# Patient Record
Sex: FEMALE | Race: BLACK OR AFRICAN AMERICAN | NOT HISPANIC OR LATINO | Employment: OTHER | URBAN - METROPOLITAN AREA
[De-identification: names, ages, dates, MRNs, and addresses within clinical notes are randomized per-mention and may not be internally consistent; named-entity substitution may affect disease eponyms.]

---

## 2024-09-25 DIAGNOSIS — Z12.11 COLON CANCER SCREENING: Primary | ICD-10-CM

## 2024-10-08 RX ORDER — LISINOPRIL AND HYDROCHLOROTHIAZIDE 12.5; 2 MG/1; MG/1
1 TABLET ORAL DAILY
COMMUNITY

## 2024-10-08 RX ORDER — ERGOCALCIFEROL 1.25 MG/1
50000 CAPSULE ORAL
COMMUNITY

## 2024-10-08 NOTE — H&P
Name AGUILAR ESCOBEDO (73yo, F)       1950         Ins[place] Med [place]  Insurance # : [id number]  Med Secondary[place]  Insurance # : [id number]  Policy/Group # : [id number]  Prescription: [facility] - Member is eligible. details     Chief Complaint  Colonoscopy Screening  Patient's Care Team  Primary Care Provider: SERA MOYA [facility] [address] [facility] [place] [address], Ph [phone], Fax [phone] NPI: [id number]   Patient's Pharmacies  [id number] ([facility]): [address] [facility] [place] [address], Ph [phone], Fax [phone]   Vitals    T: 97.1 F° 2024 11:16 am BP: 139/100 2024 11:17 am Pulse: 73 bpm 2024 11:17 am   O2Sat: 98% 2024 11:16 am Ht: 5 ft 3 in 2024 11:15 am Wt: 182 lbs 2024 11:15 am   BMI: 32.2 2024 11:15 am       Allergies    Reviewed Allergies   NKDA   Medications    Reviewed Medications    bisacodyL 5 mg tablet,delayed release  Take 4 tablet(s) as needed by oral route as directed.  Internal Note: take all four tablets for 12:00 noon the day before procedure 24   prescribed Audrey Ba NP   ergocalciferol (vitamin D2) 1,250 mcg (50,000 unit) capsule  TAKE 1 CAPSULE BY MOUTH WEEKLY FOR 90 DAYS 24   filled surescripts   lisinopriL 20 mg-hydrochlorothiazide 12.5 mg tablet 10/23/14   filled [employer]   sodium,potassium,mag sulfates 17.5 gram-3.13 gram-1.6 gram oral soln  Take 1 package(s) by oral route as directed for 1 day. 24   prescribed Audrey Ba NP   Problems  Reviewed Problems  Hypertensive disorder  Diverticular disease  Screening for malignant neoplasm of colon    Family History  Reviewed Family History  Non-contributory.   FH-none   Social History  Reviewed Social History   Substance Use  Do you or have you ever smoked tobacco?: Never smoker  How many years have you smoked tobacco?: 0  How much tobacco do you smoke?: None  What is your level of alcohol  consumption?: None  Which illicit or recreational drugs have you used?: none  Activities of Daily Living  Are you blind or do you have difficulty seeing?: No  Are you deaf or do you have serious difficulty hearing? : No  Do you have difficulty concentrating, remembering or making decisions?: No  Do you have difficulty walking or climbing stairs?: No  Do you have difficulty dressing or bathing?: No  Do you have difficulty doing errands alone?: No    Surgical History  Reviewed Surgical History  Biopsy of breast open  Colonoscopy - 2014    Past Medical History  Reviewed Past Medical History  High Blood Pressure: Y  Colon Polyps: Y - 2   Screening  None recorded.  HPI  Patient is here today for COLN SCRN. She does not c/o diarrhea, constipation, rectal bleeding, abdominal pain, nausea, vomiting, or heartburn. NO FHCC  ROS  Patient reports no fever, no significant weight gain, and no significant weight loss. She reports no vision change. She reports no difficulty hearing. She reports no sore throat. She reports no chest pain and no palpitations. She reports no cough and no shortness of breath. She reports no abdominal pain, no vomiting, normal appetite, no diarrhea, and not vomiting blood. She reports no incontinence, no difficulty urinating, no hematuria, and no increased frequency. She reports no arthralgias/joint pain, no back pain, and no swelling in the extremities. She reports no jaundice and no rashes. She reports no seizures and no dizziness. She reports no depression and no sleep disturbances.  Physical Exam  Constitutional: General Appearance: healthy-appearing, well-nourished, and well-developed. Level of Distress: NAD. Ambulation: ambulating normally.     Psychiatric: Insight: good judgement. Mental Status: normal mood and affect and active and alert. Orientation: to time, place, and person. Memory: recent memory normal and remote memory normal.     Head: Head: normocephalic and atraumatic.     Eyes: Lids and  Conjunctivae: no discharge or pallor and non-injected. Pupils: PERRLA. Corneas: grossly intact. EOM: EOMI. Lens: clear. Sclerae: non-icteric.     ENMT: Ears: no lesions on external ear. Hearing: no hearing loss. Nose: no lesions on external nose or nasal discharge and nasal passages clear. Oropharynx: moist mucous membranes.     Neck: Neck: supple, FROM, trachea midline, and no masses. Thyroid: no enlargement.     Lungs: Respiratory effort: no dyspnea. Percussion: no dullness, flatness, or hyperresonance. Auscultation: no wheezing, rales/crackles, or rhonchi and breath sounds normal, good air movement, and CTA except as noted.     Cardiovascular: Apical Impulse: not displaced. Heart Auscultation: normal S1 and S2; no murmurs, rubs, or gallops; and RRR.     Abdomen: Bowel Sounds: normal. Inspection and Palpation: no tenderness, guarding, masses, rebound tenderness, or CVA tenderness and soft and non-distended. Liver: non-tender and no hepatomegaly. Spleen: non-tender and no splenomegaly. Hernia: none palpable.     Musculoskeletal:: Motor Strength and Tone: normal tone and motor strength. Joints, Bones, and Muscles: normal movement of all extremities. Extremities: no cyanosis or edema.     Neurologic: Gait and Station: normal gait and station. Cranial Nerves: grossly intact.     Skin: Inspection and palpation: no rash, lesions, ulcer, induration, nodules, jaundice, or abnormal nevi and good turgor.  Assessment / Plan  Pt presents for repeat colonoscopy. In 2014 she had diverticulosis and hyperplastic polyps.     Plan:  1. Schedule colonoscopy       1. Screening for malignant neoplasm of colon  Z12.11: Encounter for screening for malignant neoplasm of colon  COLONOSCOPY SCREENING (PROC)  bisacodyl 5 mg tablet,delayed release -   Take 4 tablet(s) as needed by oral route as directed.     Qty: (4)  tablet     Refills: 0     Pharmacy: [id number]     Note to Pharmacy: take all four tablets for 12:00 noon the day  before procedure  sodium,potassium,mag sulfates 17.5 gram-3.13 gram-1.6 gram oral soln -   Take 1 package(s) by oral route as directed for 1 day.     Qty: (1)  354 mL kit     Refills: 0     Pharmacy: [id number]     Patient Instructions  SCHEDULE COLONOSCOPY.  FOLLOW PREP INSTRUCTIONS GIVEN TO YOU ON INSTRUCTION SHEET.  WILL CALL WITH COLON RESULTS UNLESS APPOINTMENT IS NEEDED  AVOID CORN AND NUTS 3 DAYS PRIOR TO PROCEDURE      Discussion Notes  DISCUSSED RISK WITH PATIENT: TEARS OR PERFORATION THAT COULD REQUIRE OTHER SURGERIES, BLEEDING, INFECTIONS, ALLERGIC REACTIONS TO MEDICATION AND ANESTHESIA COULD CAUSE CARDIOPULMONARY ARREST. THE PATIENT AGREES TO PROCEED.

## 2024-10-09 ENCOUNTER — ANESTHESIA EVENT (OUTPATIENT)
Dept: ENDOSCOPY | Facility: HOSPITAL | Age: 74
End: 2024-10-09
Payer: MEDICARE

## 2024-10-14 NOTE — ANESTHESIA PREPROCEDURE EVALUATION
10/14/2024  Erin Lopez is a 74 y.o., female.  Past Medical History:   Diagnosis Date    Diverticular disease of large intestine without perforation or abscess     Essential (primary) hypertension      Past Medical History:   Diagnosis Date    Diverticular disease of large intestine without perforation or abscess     Essential (primary) hypertension          Pre-op Assessment    I have reviewed the Patient Summary Reports.     I have reviewed the Nursing Notes. I have reviewed the NPO Status.   I have reviewed the Medications.     Review of Systems  Anesthesia Hx:  No problems with previous Anesthesia   History of prior surgery of interest to airway management or planning:            Denies Personal Hx of Anesthesia complications.                    Social:  Non-Smoker, Social Alcohol Use       Cardiovascular:     Hypertension                                          Hepatic/GI:  Bowel Prep.      diverticulosis                 Physical Exam  General: Well nourished, Cooperative, Alert and Oriented    Airway:  Mallampati: II   Mouth Opening: Normal  TM Distance: Normal  Tongue: Normal  Neck ROM: Normal ROM    Dental:  Intact        Anesthesia Plan  Type of Anesthesia, risks & benefits discussed:    Anesthesia Type: Gen Natural Airway  Intra-op Monitoring Plan: Standard ASA Monitors  Post Op Pain Control Plan: multimodal analgesia  Induction:  IV  Informed Consent: Informed consent signed with the Patient and all parties understand the risks and agree with anesthesia plan.  All questions answered. Patient consented to blood products? No  ASA Score: 2  Day of Surgery Review of History & Physical: H&P Update referred to the surgeon/provider.    Ready For Surgery From Anesthesia Perspective.     .

## 2024-10-15 ENCOUNTER — ANESTHESIA (OUTPATIENT)
Dept: ENDOSCOPY | Facility: HOSPITAL | Age: 74
End: 2024-10-15
Payer: MEDICARE

## 2024-11-04 ENCOUNTER — HOSPITAL ENCOUNTER (OUTPATIENT)
Facility: HOSPITAL | Age: 74
Discharge: HOME | End: 2024-11-04
Attending: INTERNAL MEDICINE | Admitting: INTERNAL MEDICINE
Payer: MEDICARE

## 2024-11-04 VITALS
HEIGHT: 63 IN | TEMPERATURE: 98 F | DIASTOLIC BLOOD PRESSURE: 75 MMHG | OXYGEN SATURATION: 97 % | HEART RATE: 77 BPM | SYSTOLIC BLOOD PRESSURE: 116 MMHG | WEIGHT: 173.38 LBS | BODY MASS INDEX: 30.72 KG/M2 | RESPIRATION RATE: 16 BRPM

## 2024-11-04 PROCEDURE — 88305 TISSUE EXAM BY PATHOLOGIST: CPT | Performed by: STUDENT IN AN ORGANIZED HEALTH CARE EDUCATION/TRAINING PROGRAM

## 2024-11-04 PROCEDURE — 37000009 HC ANESTHESIA EA ADD 15 MINS: Performed by: INTERNAL MEDICINE

## 2024-11-04 PROCEDURE — 88305 TISSUE EXAM BY PATHOLOGIST: CPT | Mod: 26,,, | Performed by: STUDENT IN AN ORGANIZED HEALTH CARE EDUCATION/TRAINING PROGRAM

## 2024-11-04 PROCEDURE — 63600175 PHARM REV CODE 636 W HCPCS: Performed by: NURSE ANESTHETIST, CERTIFIED REGISTERED

## 2024-11-04 PROCEDURE — 37000008 HC ANESTHESIA 1ST 15 MINUTES: Performed by: INTERNAL MEDICINE

## 2024-11-04 PROCEDURE — D9220A PRA ANESTHESIA: Mod: PT,,, | Performed by: NURSE ANESTHETIST, CERTIFIED REGISTERED

## 2024-11-04 PROCEDURE — 25000003 PHARM REV CODE 250: Performed by: INTERNAL MEDICINE

## 2024-11-04 PROCEDURE — 63600175 PHARM REV CODE 636 W HCPCS: Performed by: INTERNAL MEDICINE

## 2024-11-04 PROCEDURE — 45380 COLONOSCOPY AND BIOPSY: CPT | Mod: PT | Performed by: INTERNAL MEDICINE

## 2024-11-04 PROCEDURE — 27201012 HC FORCEPS, HOT/COLD, DISP: Performed by: INTERNAL MEDICINE

## 2024-11-04 RX ORDER — DEXTROMETHORPHAN/PSEUDOEPHED 2.5-7.5/.8
DROPS ORAL
Status: DISCONTINUED | OUTPATIENT
Start: 2024-11-04 | End: 2024-11-04 | Stop reason: HOSPADM

## 2024-11-04 RX ORDER — LIDOCAINE HYDROCHLORIDE 20 MG/ML
INJECTION INTRAVENOUS
Status: DISCONTINUED | OUTPATIENT
Start: 2024-11-04 | End: 2024-11-04

## 2024-11-04 RX ORDER — SODIUM CHLORIDE, SODIUM LACTATE, POTASSIUM CHLORIDE, CALCIUM CHLORIDE 600; 310; 30; 20 MG/100ML; MG/100ML; MG/100ML; MG/100ML
INJECTION, SOLUTION INTRAVENOUS CONTINUOUS
Status: DISCONTINUED | OUTPATIENT
Start: 2024-11-04 | End: 2024-11-04 | Stop reason: HOSPADM

## 2024-11-04 RX ORDER — PROPOFOL 10 MG/ML
INJECTION, EMULSION INTRAVENOUS
Status: DISCONTINUED | OUTPATIENT
Start: 2024-11-04 | End: 2024-11-04

## 2024-11-04 RX ADMIN — PROPOFOL 30 MG: 10 INJECTION, EMULSION INTRAVENOUS at 08:11

## 2024-11-04 RX ADMIN — SODIUM CHLORIDE, POTASSIUM CHLORIDE, SODIUM LACTATE AND CALCIUM CHLORIDE: 600; 310; 30; 20 INJECTION, SOLUTION INTRAVENOUS at 08:11

## 2024-11-04 RX ADMIN — LIDOCAINE HYDROCHLORIDE 50 MG: 20 INJECTION INTRAVENOUS at 08:11

## 2024-11-04 RX ADMIN — PROPOFOL 10 MG: 10 INJECTION, EMULSION INTRAVENOUS at 08:11

## 2024-11-04 RX ADMIN — PROPOFOL 100 MG: 10 INJECTION, EMULSION INTRAVENOUS at 08:11

## 2024-11-04 NOTE — PLAN OF CARE
D/C instructions reviewed with pt. Verbalized understanding. Pt. Tolerating liquids. VS stable. D/C home with ride. Dr. Stokes spoke to pt and pt family.

## 2024-11-04 NOTE — PROVATION PATIENT INSTRUCTIONS
Discharge Summary/Instructions after an Endoscopic Procedure  Patient Name: Erin Armstrong  Patient MRN: [id number]  Patient YOB: 1950 Monday, November 4, 2024  Poli Stokes MD  Dear patient,  As a result of recent federal legislation (The Federal Cures Act), you may   receive lab or pathology results from your procedure in your [facility]   account before your physician is able to contact you. Your physician or   their representative will relay the results to you with their   recommendations at their soonest availability.  Thank you,  RESTRICTIONS:  During your procedure today, you received medications for sedation.  These   medications may affect your judgment, balance and coordination.  Therefore,   for 24 hours, you have the following restrictions:   - DO NOT drive a car, operate machinery, make legal/financial decisions,   sign important papers or drink alcohol.    ACTIVITY:  Today: no heavy lifting, straining or running due to procedural   sedation/anesthesia.  The following day: return to full activity including work.  DIET:  Eat and drink normally unless instructed otherwise.     TREATMENT FOR COMMON SIDE EFFECTS:  - Mild abdominal pain, nausea, belching, bloating or excessive gas:  rest,   eat lightly and use a heating pad.  - Sore Throat: treat with throat lozenges and/or gargle with warm salt   water.  - Because air was used during the procedure, expelling large amounts of air   from your rectum or belching is normal.  - If a bowel prep was taken, you may not have a bowel movement for 1-3 days.    This is normal.  SYMPTOMS TO WATCH FOR AND REPORT TO YOUR PHYSICIAN:  1. Abdominal pain or bloating, other than gas cramps.  2. Chest pain.  3. Back pain.  4. Signs of infection such as: chills or fever occurring within 24 hours   after the procedure.  5. Rectal bleeding, which would show as bright red, maroon, or black stools.   (A tablespoon of blood from the rectum is not serious,  especially if   hemorrhoids are present.)  6. Vomiting.  7. Weakness or dizziness.  GO DIRECTLY TO THE NEAREST EMERGENCY ROOM IF YOU HAVE ANY OF THE FOLLOWING:      Difficulty breathing              Chills and/or fever over 101 F   Persistent vomiting and/or vomiting blood   Severe abdominal pain   Severe chest pain   Black, tarry stools   Bleeding- more than one tablespoon   Any other symptom or condition that you feel may need urgent attention  Your doctor recommends these additional instructions:  If any biopsies were taken, your doctors clinic will contact you in 1 to 2   weeks with any results.  - Discharge patient to home (ambulatory).   - Resume previous diet today.   - Continue present medications.   - Repeat colonoscopy PRN for surveillance.   - Return to my office in 2 weeks.   - After age 75 ,colonoscopy to be done as needed  - Patient has a contact number available for emergencies.  The signs and   symptoms of potential delayed complications were discussed with the   patient.  Return to normal activities tomorrow.  Written discharge   instructions were provided to the patient.   - The signs and symptoms of potential delayed complications were discussed   with the patient.   - Return patient to referring hospital for observation.   - Patient has a contact number available for emergencies.  The signs and   symptoms of potential delayed complications were discussed with the   patient.  Return to normal activities tomorrow.  Written discharge   instructions were provided to the patient.   - Patient has a contact number available for emergencies.   - Return patient to referring hospital for observation.  For questions, problems or results please call your physician Poli Stokes MD at Work:  [phone]  If you have any questions about the above instructions, call the GI   department at [phone] or call the endoscopy unit at [phone]   from 7am until 3 pm.  [facility]  [place], EMERGENCY ROOM PHONE NUMBER:   [phone]  IF A COMPLICATION OR EMERGENCY SITUATION ARISES AND YOU ARE UNABLE TO REACH   YOUR PHYSICIAN - GO DIRECTLY TO THE EMERGENCY ROOM.  I have read or have had read to me these discharge instructions for my   procedure and have received a written copy.  I understand these   instructions and will follow-up with my physician if I have any questions.     __________________________________       _____________________________________  Nurse Signature                                          Patient/Designated   Responsible Party Signature  MD Poli Evangelista MD  11/4/2024 8:28:06 AM  This report has been verified and signed electronically.  Dear patient,  As a result of recent federal legislation (The Federal Cures Act), you may   receive lab or pathology results from your procedure in your [facility]   account before your physician is able to contact you. Your physician or   their representative will relay the results to you with their   recommendations at their soonest availability.  Thank you,  PROVATION

## 2024-11-04 NOTE — ANESTHESIA POSTPROCEDURE EVALUATION
Anesthesia Post Evaluation    Patient: Erin Lopez    Procedure(s) Performed: Procedure(s) (LRB):  COLONOSCOPY (N/A)    Final Anesthesia Type: general      Patient location during evaluation: PACU  Patient participation: Yes- Able to Participate  Level of consciousness: awake  Post-procedure vital signs: reviewed and stable  Pain management: adequate  Airway patency: patent    PONV status at discharge: No PONV  Anesthetic complications: no      Cardiovascular status: stable  Respiratory status: unassisted  Hydration status: euvolemic  Follow-up not needed.              Vitals Value Taken Time   /75 11/04/24 0837   Temp 36.4 °C (97.5 °F) 11/04/24 0827   Pulse 84 11/04/24 0837   Resp 16 11/04/24 0837   SpO2 97 % 11/04/24 0837         No case tracking events are documented in the log.      Pain/Chase Score: Chase Score: 10 (11/4/2024  8:37 AM)

## 2024-11-04 NOTE — TRANSFER OF CARE
"Anesthesia Transfer of Care Note    Patient: Erin Lopez    Procedure(s) Performed: Procedure(s) (LRB):  COLONOSCOPY (N/A)    Patient location: PACU    Anesthesia Type: general    Transport from OR: Transported from OR on room air with adequate spontaneous ventilation    Post pain: adequate analgesia    Post assessment: no apparent anesthetic complications and tolerated procedure well    Post vital signs: stable    Level of consciousness: awake    Nausea/Vomiting: no nausea/vomiting    Complications: none    Transfer of care protocol was followed      Last vitals: Visit Vitals  /89 (BP Location: Right arm, Patient Position: Sitting)   Pulse 103   Temp 35.7 °C (96.2 °F) (Temporal)   Resp 17   Ht 5' 3" (1.6 m)   Wt 78.7 kg (173 lb 6.3 oz)   SpO2 98%   Breastfeeding No   BMI 30.71 kg/m²     "

## 2024-11-06 LAB
FINAL PATHOLOGIC DIAGNOSIS: NORMAL
GROSS: NORMAL
Lab: NORMAL